# Patient Record
Sex: FEMALE | Race: WHITE | NOT HISPANIC OR LATINO | Employment: FULL TIME | URBAN - METROPOLITAN AREA
[De-identification: names, ages, dates, MRNs, and addresses within clinical notes are randomized per-mention and may not be internally consistent; named-entity substitution may affect disease eponyms.]

---

## 2019-10-22 ENCOUNTER — HOSPITAL ENCOUNTER (EMERGENCY)
Facility: HOSPITAL | Age: 24
Discharge: HOME | End: 2019-10-22
Attending: EMERGENCY MEDICINE | Admitting: EMERGENCY MEDICINE
Payer: COMMERCIAL

## 2019-10-22 VITALS
DIASTOLIC BLOOD PRESSURE: 70 MMHG | OXYGEN SATURATION: 98 % | RESPIRATION RATE: 18 BRPM | WEIGHT: 120 LBS | HEIGHT: 62 IN | SYSTOLIC BLOOD PRESSURE: 119 MMHG | TEMPERATURE: 97.8 F | HEART RATE: 80 BPM | BODY MASS INDEX: 22.08 KG/M2

## 2019-10-22 DIAGNOSIS — F41.9 ANXIETY: Primary | ICD-10-CM

## 2019-10-22 LAB
ANION GAP SERPL CALCULATED.3IONS-SCNC: 6 MMOL/L (ref 4–13)
BASOPHILS # BLD AUTO: 0.03 THOUSANDS/ΜL (ref 0–0.1)
BASOPHILS NFR BLD AUTO: 1 % (ref 0–1)
BUN SERPL-MCNC: 8 MG/DL (ref 5–25)
CALCIUM SERPL-MCNC: 9 MG/DL (ref 8.3–10.1)
CHLORIDE SERPL-SCNC: 110 MMOL/L (ref 100–108)
CO2 SERPL-SCNC: 24 MMOL/L (ref 21–32)
CREAT SERPL-MCNC: 0.69 MG/DL (ref 0.6–1.3)
EOSINOPHIL # BLD AUTO: 0.1 THOUSAND/ΜL (ref 0–0.61)
EOSINOPHIL NFR BLD AUTO: 2 % (ref 0–6)
ERYTHROCYTE [DISTWIDTH] IN BLOOD BY AUTOMATED COUNT: 11.8 % (ref 11.6–15.1)
EXT PREG TEST URINE: NEGATIVE
EXT. CONTROL ED NAV: NORMAL
GFR SERPL CREATININE-BSD FRML MDRD: 121 ML/MIN/1.73SQ M
GLUCOSE SERPL-MCNC: 117 MG/DL (ref 65–140)
HCT VFR BLD AUTO: 38.8 % (ref 34.8–46.1)
HGB BLD-MCNC: 13.5 G/DL (ref 11.5–15.4)
IMM GRANULOCYTES # BLD AUTO: 0 THOUSAND/UL (ref 0–0.2)
IMM GRANULOCYTES NFR BLD AUTO: 0 % (ref 0–2)
LYMPHOCYTES # BLD AUTO: 1.44 THOUSANDS/ΜL (ref 0.6–4.47)
LYMPHOCYTES NFR BLD AUTO: 34 % (ref 14–44)
MCH RBC QN AUTO: 30.5 PG (ref 26.8–34.3)
MCHC RBC AUTO-ENTMCNC: 34.8 G/DL (ref 31.4–37.4)
MCV RBC AUTO: 88 FL (ref 82–98)
MONOCYTES # BLD AUTO: 0.24 THOUSAND/ΜL (ref 0.17–1.22)
MONOCYTES NFR BLD AUTO: 6 % (ref 4–12)
NEUTROPHILS # BLD AUTO: 2.38 THOUSANDS/ΜL (ref 1.85–7.62)
NEUTS SEG NFR BLD AUTO: 57 % (ref 43–75)
NRBC BLD AUTO-RTO: 0 /100 WBCS
PLATELET # BLD AUTO: 127 THOUSANDS/UL (ref 149–390)
PMV BLD AUTO: 11.2 FL (ref 8.9–12.7)
POTASSIUM SERPL-SCNC: 3.9 MMOL/L (ref 3.5–5.3)
RBC # BLD AUTO: 4.43 MILLION/UL (ref 3.81–5.12)
SODIUM SERPL-SCNC: 140 MMOL/L (ref 136–145)
TSH SERPL DL<=0.05 MIU/L-ACNC: 3.62 UIU/ML (ref 0.36–3.74)
WBC # BLD AUTO: 4.19 THOUSAND/UL (ref 4.31–10.16)

## 2019-10-22 PROCEDURE — 85025 COMPLETE CBC W/AUTO DIFF WBC: CPT | Performed by: EMERGENCY MEDICINE

## 2019-10-22 PROCEDURE — 84443 ASSAY THYROID STIM HORMONE: CPT | Performed by: EMERGENCY MEDICINE

## 2019-10-22 PROCEDURE — 36415 COLL VENOUS BLD VENIPUNCTURE: CPT | Performed by: EMERGENCY MEDICINE

## 2019-10-22 PROCEDURE — 80048 BASIC METABOLIC PNL TOTAL CA: CPT | Performed by: EMERGENCY MEDICINE

## 2019-10-22 PROCEDURE — 81025 URINE PREGNANCY TEST: CPT | Performed by: EMERGENCY MEDICINE

## 2019-10-22 PROCEDURE — 99283 EMERGENCY DEPT VISIT LOW MDM: CPT

## 2019-10-22 PROCEDURE — 93005 ELECTROCARDIOGRAM TRACING: CPT

## 2019-10-22 PROCEDURE — 99284 EMERGENCY DEPT VISIT MOD MDM: CPT | Performed by: EMERGENCY MEDICINE

## 2019-10-22 RX ORDER — LORAZEPAM 0.5 MG/1
0.5 TABLET ORAL ONCE
Status: COMPLETED | OUTPATIENT
Start: 2019-10-22 | End: 2019-10-22

## 2019-10-22 RX ORDER — MULTIVITAMIN
1 TABLET ORAL DAILY
COMMUNITY

## 2019-10-22 RX ORDER — NORETHINDRONE ACETATE AND ETHINYL ESTRADIOL 1; 5 MG/1; UG/1
TABLET ORAL DAILY
COMMUNITY

## 2019-10-22 RX ADMIN — LORAZEPAM 0.5 MG: 0.5 TABLET ORAL at 08:36

## 2019-10-22 NOTE — ED ATTENDING ATTESTATION
10/22/2019  I, Benjamin Solis MD, saw and evaluated the patient  I have discussed the patient with the resident/non-physician practitioner and agree with the resident's/non-physician practitioner's findings, Plan of Care, and MDM as documented in the resident's/non-physician practitioner's note, except where noted  All available labs and Radiology studies were reviewed  I was present for key portions of any procedure(s) performed by the resident/non-physician practitioner and I was immediately available to provide assistance  At this point I agree with the current assessment done in the Emergency Department  I have conducted an independent evaluation of this patient a history and physical is as follows:    ED Course     Patient reports approximately 10 days of feeling increased anxiety  The patient has had a history of anxiety in the past was medicated for her anxiety in the past   The patient is unaware of any new psychosocial stressors and states that she has not been depressed but has been quite happy lately  Patient has periods of feeling anxious where she feels her heart beating fast and feels the heart pounding in her chest   Patient does not have true chest pain but feels like she is breathing fast   The patient works night shift and had 1 of these episodes over the night shift and so decided to come to the emergency department  The patient feels back to normal now  The patient also reports mood swings recently  Physical exam demonstrates a pleasant alert nontoxic female who appears to be and no distress  HEENT exam is normal   Lungs are clear with equal breath sounds  The heart had a regular rate rhythm  The abdomen is soft and nontender  Chest was nontender  Patient was alert and oriented x3 without focal neurologic deficit  Skin had no rash  The patient denies suicidal homicidal ideation  There is no evidence of delusions or hallucinations      Critical Care Time  Procedures

## 2019-10-22 NOTE — ED PROVIDER NOTES
History  Chief Complaint   Patient presents with    Anxiety     Pt states recently she feels like her anxiety has been at an all time high, almost like her heart is beating out of her chest       Patient is a 48-year-old female a past medical history of anxiety presenting for anxiety  Patient says that over the last 2 months or anxiety has gotten worse  She says over the last week it has been really bad  Patient says that she was at work tonight when she developed the feeling like her heart was pounding harder "  She says it was associated with some chest tightness and shortness of breath  Patient says that this feels similar to anxiety that she has had in the past   She also says that she has had mood swings over the past month or so and has been getting her  Every other week    Patient says that she stopped her anxiety medication about 4 years ago with no issues up until now  She says that she is feeling better now  She denies headache, dizziness, nausea, vomiting, abdominal pain  She denies any medical history and does not take any medications besides her birth control  Prior to Admission Medications   Prescriptions Last Dose Informant Patient Reported? Taking? Multiple Vitamin (MULTIVITAMIN) tablet  Self Yes Yes   Sig: Take 1 tablet by mouth daily   norethindrone-ethinyl estradiol (FEMHRT 1/5) 1-5 MG-MCG TABS  Self Yes Yes   Sig: Take by mouth daily      Facility-Administered Medications: None       No past medical history on file  No past surgical history on file  No family history on file  I have reviewed and agree with the history as documented  Social History     Tobacco Use    Smoking status: Current Every Day Smoker     Types: E-Cigarettes    Smokeless tobacco: Never Used   Substance Use Topics    Alcohol use: Not Currently    Drug use: Not Currently        Review of Systems   Constitutional: Negative for chills, diaphoresis and fever     HENT: Negative for congestion, sinus pressure, sore throat and trouble swallowing  Eyes: Negative for pain, discharge and itching  Respiratory: Positive for chest tightness and shortness of breath  Negative for cough and wheezing  Cardiovascular: Negative for chest pain, palpitations and leg swelling  Gastrointestinal: Negative for abdominal distention, abdominal pain, blood in stool, diarrhea, nausea and vomiting  Endocrine: Negative for polyphagia and polyuria  Genitourinary: Negative for difficulty urinating, dysuria, flank pain, hematuria, pelvic pain and vaginal bleeding  Musculoskeletal: Negative for arthralgias and back pain  Skin: Negative for color change and rash  Neurological: Negative for dizziness, syncope, weakness, light-headedness and headaches  Psychiatric/Behavioral: The patient is nervous/anxious  Physical Exam  ED Triage Vitals   Temperature Pulse Respirations Blood Pressure SpO2   10/22/19 0635 10/22/19 0637 10/22/19 0637 10/22/19 0637 10/22/19 0637   97 8 °F (36 6 °C) 86 18 158/89 99 %      Temp Source Heart Rate Source Patient Position - Orthostatic VS BP Location FiO2 (%)   10/22/19 0635 10/22/19 0637 10/22/19 0637 10/22/19 0637 --   Oral Monitor Lying Right arm       Pain Score       10/22/19 0637       4             Orthostatic Vital Signs  Vitals:    10/22/19 0637 10/22/19 0837   BP: 158/89 119/70   Pulse: 86 80   Patient Position - Orthostatic VS: Lying Lying       Physical Exam   Constitutional: She is oriented to person, place, and time  She appears well-developed and well-nourished  No distress  HENT:   Head: Normocephalic and atraumatic  Right Ear: External ear normal    Left Ear: External ear normal    Mouth/Throat: No oropharyngeal exudate  Eyes: Pupils are equal, round, and reactive to light  Conjunctivae are normal    Neck: Normal range of motion  Neck supple  Cardiovascular: Normal rate, regular rhythm, normal heart sounds and intact distal pulses   Exam reveals no gallop and no friction rub  No murmur heard  Pulmonary/Chest: Effort normal and breath sounds normal  No respiratory distress  She has no wheezes  She has no rales  Abdominal: Soft  She exhibits no distension  There is no tenderness  There is no guarding  Musculoskeletal: Normal range of motion  She exhibits no edema, tenderness or deformity  Lymphadenopathy:     She has no cervical adenopathy  Neurological: She is alert and oriented to person, place, and time  No cranial nerve deficit or sensory deficit  She exhibits normal muscle tone  Skin: Skin is warm and dry  Psychiatric: She has a normal mood and affect  Nursing note and vitals reviewed  ED Medications  Medications   LORazepam (ATIVAN) tablet 0 5 mg (0 5 mg Oral Given 10/22/19 0836)       Diagnostic Studies  Results Reviewed     Procedure Component Value Units Date/Time    POCT pregnancy, urine [[id number]]  (Normal) Resulted:  10/22/19 0752    Lab Status:  Final result Updated:  10/22/19 0752     EXT PREG TEST UR (Ref: Negative) Negative     Control valid    TSH, 3rd generation with Free T4 reflex [[id number]]  (Normal) Collected:  10/22/19 0710    Lab Status:  Final result Specimen:  Blood from Arm, Left Updated:  10/22/19 0742     TSH 3RD GENERATON 3 620 uIU/mL     Narrative:       Patients undergoing fluorescein dye angiography may retain small amounts of fluorescein in the body for 48-72 hours post procedure  Samples containing fluorescein can produce falsely depressed TSH values  If the patient had this procedure,a specimen should be resubmitted post fluorescein clearance        Basic metabolic panel [[id number]]  (Abnormal) Collected:  10/22/19 0710    Lab Status:  Final result Specimen:  Blood from Arm, Left Updated:  10/22/19 0742     Sodium 140 mmol/L      Potassium 3 9 mmol/L      Chloride 110 mmol/L      CO2 24 mmol/L      ANION GAP 6 mmol/L      BUN 8 mg/dL      Creatinine 0 69 mg/dL      Glucose 117 mg/dL      Calcium 9 0 mg/dL eGFR 121 ml/min/1 73sq m     Narrative:       [place] guidelines for Chronic Kidney Disease (CKD):     Stage 1 with normal or high GFR (GFR > 90 mL/min/1 73 square meters)    Stage 2 Mild CKD (GFR = 60-89 mL/min/1 73 square meters)    Stage 3A Moderate CKD (GFR = 45-59 mL/min/1 73 square meters)    Stage 3B Moderate CKD (GFR = 30-44 mL/min/1 73 square meters)    Stage 4 Severe CKD (GFR = 15-29 mL/min/1 73 square meters)    Stage 5 End Stage CKD (GFR <15 mL/min/1 73 square meters)  Note: GFR calculation is accurate only with a steady state creatinine    CBC and differential [[id number]]  (Abnormal) Collected:  10/22/19 0710    Lab Status:  Final result Specimen:  Blood from Arm, Left Updated:  10/22/19 0717     WBC 4 19 Thousand/uL      RBC 4 43 Million/uL      Hemoglobin 13 5 g/dL      Hematocrit 38 8 %      MCV 88 fL      MCH 30 5 pg      MCHC 34 8 g/dL      RDW 11 8 %      MPV 11 2 fL      Platelets 028 Thousands/uL      nRBC 0 /100 WBCs      Neutrophils Relative 57 %      Immat GRANS % 0 %      Lymphocytes Relative 34 %      Monocytes Relative 6 %      Eosinophils Relative 2 %      Basophils Relative 1 %      Neutrophils Absolute 2 38 Thousands/µL      Immature Grans Absolute 0 00 Thousand/uL      Lymphocytes Absolute 1 44 Thousands/µL      Monocytes Absolute 0 24 Thousand/µL      Eosinophils Absolute 0 10 Thousand/µL      Basophils Absolute 0 03 Thousands/µL                  No orders to display         Procedures  ECG 12 Lead Documentation Only  Date/Time: 10/22/2019 2:46 PM  Performed by: Letha Fulton DO  Authorized by: Letha Fulton DO     Indications / Diagnosis:  Chest tightness  ECG reviewed by me, the ED Provider: yes    Patient location:  ED  Previous ECG:     Previous ECG:  Unavailable    Comparison to cardiac monitor: Yes    Interpretation:     Interpretation: normal    Rate:     ECG rate:  76    ECG rate assessment: normal    Rhythm:     Rhythm: sinus rhythm Ectopy:     Ectopy: none    QRS:     QRS axis:  Normal  Conduction:     Conduction: normal    ST segments:     ST segments:  Normal  T waves:     T waves: normal              ED Course                               MDM  Number of Diagnoses or Management Options  Diagnosis management comments: 16-year-old female presenting for anxiety  Has had worsening anxiety over the past month acutely worse over the past week  This morning at work had episode of feeling her heart was beating stronger than normal   Was associated with some chest tightness and shortness of breath  Feels similar anxiety she has had before  No other medical history  Will obtain CBC to check hemoglobin, will check BMP for electrolytes  Will obtain TSH  Will get EKG  Patient says she is feeling better now so does not need anxiety medication at this time  Disposition  Final diagnoses:   Anxiety     Time reflects when diagnosis was documented in both MDM as applicable and the Disposition within this note     Time User Action Codes Description Comment    10/22/2019  8:07 AM Kaleb Britton Add [F41 9] Anxiety       ED Disposition     ED Disposition Condition Date/Time Comment    Discharge Stable Tue Oct 22, 2019  8:07 AM Rojelio Isaacs discharge to home/self care  Follow-up Information     Follow up With Specialties Details Why Contact Info    Kacie Fountain MD [facility] Schedule an appointment as soon as possible for a visit  For follow up of anxiety [address] [place] [id number] [phone]            Discharge Medication List as of 10/22/2019  8:07 AM      CONTINUE these medications which have NOT CHANGED    Details   Multiple Vitamin (MULTIVITAMIN) tablet Take 1 tablet by mouth daily, Historical Med      norethindrone-ethinyl estradiol (FEMHRT 1/5) 1-5 MG-MCG TABS Take by mouth daily, Historical Med           No discharge procedures on file      ED Provider  Attending physically available and sang Marcus I managed the patient along with the ED Attending      Electronically Signed by         Lionel Phan DO  10/22/19 7307

## 2019-10-23 LAB
ATRIAL RATE: 76 BPM
P AXIS: 60 DEGREES
PR INTERVAL: 124 MS
QRS AXIS: 88 DEGREES
QRSD INTERVAL: 86 MS
QT INTERVAL: 394 MS
QTC INTERVAL: 443 MS
T WAVE AXIS: 60 DEGREES
VENTRICULAR RATE: 76 BPM

## 2019-10-23 PROCEDURE — 93010 ELECTROCARDIOGRAM REPORT: CPT | Performed by: INTERNAL MEDICINE

## 2022-12-16 ENCOUNTER — OCCMED (OUTPATIENT)
Dept: URGENT CARE | Facility: CLINIC | Age: 27
End: 2022-12-16

## 2022-12-16 ENCOUNTER — APPOINTMENT (OUTPATIENT)
Dept: RADIOLOGY | Facility: CLINIC | Age: 27
End: 2022-12-16

## 2022-12-16 DIAGNOSIS — M54.50 ACUTE BILATERAL LOW BACK PAIN WITHOUT SCIATICA: ICD-10-CM
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
## 2022-12-16 DIAGNOSIS — M54.50 ACUTE BILATERAL LOW BACK PAIN WITHOUT SCIATICA: Primary | ICD-10-CM

## 2022-12-19 ENCOUNTER — APPOINTMENT (OUTPATIENT)
Dept: URGENT CARE | Facility: CLINIC | Age: 27
End: 2022-12-19

## 2023-01-04 ENCOUNTER — EVALUATION (OUTPATIENT)
Dept: PHYSICAL THERAPY | Age: 28
End: 2023-01-04

## 2023-01-04 DIAGNOSIS — M54.16 LUMBAR RADICULOPATHY: Primary | ICD-10-CM

## 2023-01-04 NOTE — LETTER
2023    Bulah Kanner, [address] [facility] [address]    Patient: Cecil Boyer   YOB: 1995   Date of Visit: 2023     Encounter Diagnosis     ICD-10-CM    1  Lumbar radiculopathy  M54 16           Dear Dr Rome Lopez:    Thank you for your recent referral of Cecil Boyer  Please review the attached evaluation summary from Jaye's recent visit  Please verify that you agree with the plan of care by signing the attached order  If you have any questions or concerns, please do not hesitate to call  I sincerely appreciate the opportunity to share in the care of one of your patients and hope to have another opportunity to work with you in the near future  Sincerely,    Carmelita Carlos, PT      Referring Provider:      I certify that I have read the below Plan of Care and certify the need for these services furnished under this plan of treatment while under my care  Bulah Kanner, PA-C  [address] [place] [id number]  [place] [facility] [place]          PT Evaluation     Today's date: 2023  Patient name: Cecil Boyer  : 1995  MRN: [id number]  Referring provider: Mendel Barlow, PA*  Dx:   Encounter Diagnosis     ICD-10-CM    1  Lumbar radiculopathy  M54 16                      Assessment  Assessment details: Pt reports to PT with cc of lumbar and R buttock pain that began roughly one month ago while lifting at work  Pt has difficulty with lifting at work and prolonged sitting   Pt has symptoms consistent with lumbar radiculopathy and would benefit from extension program   Impairments: abnormal muscle firing, abnormal muscle tone, abnormal or restricted ROM, abnormal movement, activity intolerance, impaired physical strength, lacks appropriate home exercise program, pain with function and poor body mechanics    Goals  In 4 weeks pt will:  -Be independent with phase I of HEP  -Increase LE strength by 1/2 grade  -Increase Lumber ROM by 25%    By discharge pt will:  -Be independent with Phase II of HEP  -Demonstrate full LE strength  -Demonstrate full Lumbar ROM  -Report minimal pain with ADLs    Plan  Patient would benefit from: skilled physical therapy  Planned therapy interventions: abdominal trunk stabilization, joint mobilization, manual therapy, muscle pump exercises, neuromuscular re-education, patient education, strengthening, stretching, therapeutic activities, therapeutic exercise, functional ROM exercises and home exercise program  Frequency: 2x week  Duration in weeks: 4  Plan of Care beginning date: 1/4/2023  Plan of Care expiration date: 2/1/2023  Treatment plan discussed with: patient and PTA        Subjective    Objective     Active Range of Motion     Additional Active Range of Motion Details  Lumbar ROM as % of normal ROM    Flex:100  Ext:50  R ROT:75  L ROT:75      Strength/Myotome Testing     Left Hip   Planes of Motion   Abduction: 3+    Right Hip   Planes of Motion   Abduction: 3+    Left Knee   Flexion: 4  Extension: 4    Right Knee   Flexion: 4  Extension: 4    Left Ankle/Foot   Dorsiflexion: 4  Plantar flexion: 4    Right Ankle/Foot   Dorsiflexion: 4  Plantar flexion: 4    Tests     Lumbar     Left   Negative passive SLR and slump test      Right   Positive passive SLR and slump test              Precautions: N/A      Manuals                                                                 Neuro Re-Ed                                                                                                        Ther Ex             Prone lying with MH 10'            HEP-prone press up, prone lying, lifting mechanics 15'                                                                                          Ther Activity                                       Gait Training                                       Modalities

## 2023-01-04 NOTE — PROGRESS NOTES
PT Evaluation     Today's date: 2023  Patient name: Mitul Grijalva  : 1995  MRN: [id number]  Referring provider: NASIR Mayfield*  Dx:   Encounter Diagnosis     ICD-10-CM    1  Lumbar radiculopathy  M54 16                      Assessment  Assessment details: Pt reports to PT with cc of lumbar and R buttock pain that began roughly one month ago while lifting at work  Pt has difficulty with lifting at work and prolonged sitting   Pt has symptoms consistent with lumbar radiculopathy and would benefit from extension program   Impairments: abnormal muscle firing, abnormal muscle tone, abnormal or restricted ROM, abnormal movement, activity intolerance, impaired physical strength, lacks appropriate home exercise program, pain with function and poor body mechanics    Goals  In 4 weeks pt will:  -Be independent with phase I of HEP  -Increase LE strength by 1/2 grade  -Increase Lumber ROM by 25%    By discharge pt will:  -Be independent with Phase II of HEP  -Demonstrate full LE strength  -Demonstrate full Lumbar ROM  -Report minimal pain with ADLs    Plan  Patient would benefit from: skilled physical therapy  Planned therapy interventions: abdominal trunk stabilization, joint mobilization, manual therapy, muscle pump exercises, neuromuscular re-education, patient education, strengthening, stretching, therapeutic activities, therapeutic exercise, functional ROM exercises and home exercise program  Frequency: 2x week  Duration in weeks: 4  Plan of Care beginning date: 2023  Plan of Care expiration date: 2023  Treatment plan discussed with: patient and PTA        Subjective    Objective     Active Range of Motion     Additional Active Range of Motion Details  Lumbar ROM as % of normal ROM    Flex:100  Ext:50  R ROT:75  L ROT:75      Strength/Myotome Testing     Left Hip   Planes of Motion   Abduction: 3+    Right Hip   Planes of Motion   Abduction: 3+    Left Knee   Flexion: 4  Extension: 4    Right Knee   Flexion: 4  Extension: 4    Left Ankle/Foot   Dorsiflexion: 4  Plantar flexion: 4    Right Ankle/Foot   Dorsiflexion: 4  Plantar flexion: 4    Tests     Lumbar     Left   Negative passive SLR and slump test      Right   Positive passive SLR and slump test               Precautions: N/A      Manuals                                                                 Neuro Re-Ed                                                                                                        Ther Ex             Prone lying with MH 10'            HEP-prone press up, prone lying, lifting mechanics 15'                                                                                          Ther Activity                                       Gait Training                                       Modalities             MH

## 2023-01-09 ENCOUNTER — OFFICE VISIT (OUTPATIENT)
Dept: PHYSICAL THERAPY | Age: 28
End: 2023-01-09

## 2023-01-09 DIAGNOSIS — M54.16 LUMBAR RADICULOPATHY: Primary | ICD-10-CM

## 2023-01-09 NOTE — PROGRESS NOTES
Daily Note     Today's date: 2023  Patient name: Adam Sarkar  : 1995  MRN: [id number]  Referring provider: NASIR Esteves*  Dx:   Encounter Diagnosis     ICD-10-CM    1  Lumbar radiculopathy  M54 16                      Subjective: Pt reports intermittent improvement, pain is worse with working       Objective: See treatment diary below      Assessment: Tolerated treatment well  Patient demonstrated fatigue post treatment, would benefit from continued PT and pt displays increased lumbar extension, continues to have lumbar pain with this motion       Plan: Continue per plan of care  Progress treatment as tolerated         Precautions: N/A      Manuals             B lumbar roll  JASEN AGGARWAL                                     Neuro Re-Ed                                                                                                        Ther Ex             Prone lying with  10'            HEP-prone press up, prone lying, lifting mechanics 15'            Prone lying-wedge  10'           PPU  2x10           Standing ext  HEP                                                  Ther Activity                                       Gait Training                                       Modalities               10'

## 2023-01-12 ENCOUNTER — OFFICE VISIT (OUTPATIENT)
Dept: PHYSICAL THERAPY | Age: 28
End: 2023-01-12

## 2023-01-12 DIAGNOSIS — M54.16 LUMBAR RADICULOPATHY: Primary | ICD-10-CM

## 2023-01-12 NOTE — PROGRESS NOTES
Daily Note     Today's date: 2023  Patient name: Kris Kong  : 1995  MRN: [id number]  Referring provider: NASIR Mojica*  Dx:   Encounter Diagnosis     ICD-10-CM    1  Lumbar radiculopathy  M54 16                      Subjective: Pt reports intermittent R buttock sciatic symptoms especially with lifting at work  Pt does report increase in diffuse bilateral LBP  Objective: See treatment diary below    Assessment: Pt continues to demonstrate RLE radicular symptom centralization with repeated lumbar extension and would benefit from continued PT to consistently reduce radicular symptoms and perform work related activities without exacerbation of symptoms  Tolerated treatment well  Plan: Continue per plan of care  Progress treatment as tolerated               Precautions: N/A    Manuals            B lumbar roll  JASEN AGGARWAL                                    Neuro Re-Ed                                                                                                        Ther Ex             Prone lying with  10'            HEP-prone press up, prone lying, lifting mechanics 15'  Lifting techniques- reviewed           Prone lying-wedge  10' 10'          PPU  2x10 3x20          LTR   10x ea          Standing ext  HEP Reviewed                                                 Ther Activity                                       Gait Training                                       Modalities               10' Defers

## 2023-01-16 ENCOUNTER — OFFICE VISIT (OUTPATIENT)
Dept: PHYSICAL THERAPY | Age: 28
End: 2023-01-16

## 2023-01-16 DIAGNOSIS — M54.16 LUMBAR RADICULOPATHY: Primary | ICD-10-CM

## 2023-01-16 NOTE — PROGRESS NOTES
Daily Note     Today's date: 2023  Patient name: Bridget Bran  : 1995  MRN: [id number]  Referring provider: NASIR Vaughan*  Dx:   Encounter Diagnosis     ICD-10-CM    1  Lumbar radiculopathy  M54 16                      Subjective: Pt reports improvement in LE symptoms    Objective: See treatment diary below      Assessment: Tolerated treatment well  Patient demonstrated fatigue post treatment, would benefit from continued PT and pt continues to have lumbar pain, with centralization of symptoms with extension       Plan: Continue per plan of care  Progress treatment as tolerated         Precautions: N/A    Manuals           B lumbar roll  JASEN BEST mobs  JASEN AGGARWAL                                   Neuro Re-Ed                                                                                                        Ther Ex             Prone lying with  10'            HEP-prone press up, prone lying, lifting mechanics 15'  Lifting techniques- reviewed           Prone lying-wedge  10' 10' 10'         PPU  2x10 3x20 3x20         LTR   10x ea 10x ea         Standing ext  HEP Reviewed          Sideglide-R closing    2x10                                   Ther Activity                                       Gait Training                                       Modalities               10' Defers

## 2023-01-18 ENCOUNTER — OFFICE VISIT (OUTPATIENT)
Dept: PHYSICAL THERAPY | Age: 28
End: 2023-01-18

## 2023-01-18 DIAGNOSIS — M54.16 LUMBAR RADICULOPATHY: Primary | ICD-10-CM

## 2023-01-18 NOTE — PROGRESS NOTES
Daily Note     Today's date: 2023  Patient name: Bridget Bran  : 1995  MRN: [id number]  Referring provider: NASIR Vaughan*  Dx:   Encounter Diagnosis     ICD-10-CM    1  Lumbar radiculopathy  M54 16                      Subjective: Pt reports N/T into R LE at work       Objective: See treatment diary below      Assessment: Tolerated treatment well  Patient demonstrated fatigue post treatment, would benefit from continued PT and pt has abolishment of LE symptoms with PPU with overpressure/PPU with strap      Plan: Continue per plan of care  Progress treatment as tolerated         Precautions: N/A    Manuals          B lumbar roll  AGGARWAL AGGARWAL          PA mobs  JASEN AGGARWAL AGGARWAL         PPU w/ ovepressure     AGGARWAL                     Neuro Re-Ed                                                                                                        Ther Ex             Prone lying with  10'            HEP-prone press up, prone lying, lifting mechanics 15'  Lifting techniques- reviewed           Prone lying-wedge  10' 10' 10'         PPU  2x10 3x20 3x20 5x10        LTR   10x ea 10x ea         Standing ext  HEP Reviewed          Sideglide-R closing    2x10         PPU w/ strap     4x10                     Ther Activity                                       Gait Training                                       Modalities               10' Defers

## 2023-01-24 ENCOUNTER — OFFICE VISIT (OUTPATIENT)
Dept: PHYSICAL THERAPY | Age: 28
End: 2023-01-24

## 2023-01-24 DIAGNOSIS — M54.16 LUMBAR RADICULOPATHY: Primary | ICD-10-CM

## 2023-01-24 NOTE — PROGRESS NOTES
Daily Note     Today's date: 2023  Patient name: Sheri Parkinson  : 1995  MRN: [id number]  Referring provider: NASIR Sanders*  Dx:   Encounter Diagnosis     ICD-10-CM    1  Lumbar radiculopathy  M54 16                      Subjective: Pt reports no leg pain since last visit      Objective: See treatment diary below      Assessment: Tolerated treatment well  Patient demonstrated fatigue post treatment, would benefit from continued PT and pt demonstrated improved extension following session      Plan: Continue per plan of care  Progress treatment as tolerated         Precautions: N/A    Manuals         B lumbar roll  JASEN AGGARWAL          PA mobs  JASEN AGGARWAL         PPU w/ ovepressure     AGGARWAL                     Neuro Re-Ed                                                                                                        Ther Ex             Prone lying with  10'            HEP-prone press up, prone lying, lifting mechanics 15'  Lifting techniques- reviewed           Prone lying-wedge  10' 10' 10'         PPU  2x10 3x20 3x20 5x10        LTR   10x ea 10x ea         Standing ext  HEP Reviewed          Sideglide-R closing    2x10         PPU w/ strap     4x10        EIL-H2L      5x10, 2x10 w/ strap        Ther Activity                                       Gait Training                                       Modalities               10' Defers

## 2023-01-26 ENCOUNTER — OFFICE VISIT (OUTPATIENT)
Dept: PHYSICAL THERAPY | Age: 28
End: 2023-01-26

## 2023-01-26 DIAGNOSIS — M54.16 LUMBAR RADICULOPATHY: Primary | ICD-10-CM

## 2023-01-26 NOTE — PROGRESS NOTES
Daily Note     Today's date: 2023  Patient name: Gerald Acuña  : 1995  MRN: [id number]  Referring provider: NASIR Lowery*  Dx:   Encounter Diagnosis     ICD-10-CM    1  Lumbar radiculopathy  M54 16                      Subjective: Pt has no complaints       Objective: See treatment diary below      Assessment: Tolerated treatment well  Patient demonstrated fatigue post treatment, would benefit from continued PT and pt returned to saggital plane extension without reproduction of symptoms       Plan: Continue per plan of care  Progress treatment as tolerated         Precautions: N/A    Manuals        B lumbar roll  JASEN AGGARWAL          PA mobs  JASEN AGGARWAL AGGARWAL      PPU w/ ovepressure     JASEN AGGARWAL                    Neuro Re-Ed                                                                                                        Ther Ex             Prone lying with  10'            HEP-prone press up, prone lying, lifting mechanics 15'  Lifting techniques- reviewed           Prone lying-wedge  10' 10' 10'         PPU  2x10 3x20 3x20 5x10  2x10      LTR   10x ea 10x ea         Standing ext  HEP Reviewed          Sideglide-R closing    2x10         PPU w/ strap     4x10  4x10      EIL-H2L      5x10, 2x10 w/ strap                                               Ther Activity                                       Gait Training                                       Modalities               10' Defers

## 2023-01-30 ENCOUNTER — OFFICE VISIT (OUTPATIENT)
Dept: PHYSICAL THERAPY | Age: 28
End: 2023-01-30

## 2023-01-30 DIAGNOSIS — M54.16 LUMBAR RADICULOPATHY: Primary | ICD-10-CM

## 2023-01-30 NOTE — PROGRESS NOTES
Daily Note     Today's date: 2023  Patient name: Ayanna Bañuelos  : 1995  MRN: [id number]  Referring provider: NASIR Robertson*  Dx:   Encounter Diagnosis     ICD-10-CM    1  Lumbar radiculopathy  M54 16                      Subjective: Patient has no complaints  Objective: See treatment diary below      Assessment: Tolerated treatment well  Patient demonstrated fatigue post treatment, exhibited good technique with therapeutic exercises, would benefit from continued PT and patient tolerated TA contraction for the first time without symptom reproduction  Plan: Continue per plan of care  Potential discharge next visit  Progress treament per protocol        Precautions: N/A    Manuals       B lumbar roll  JASEN AGGARWAL          PA mobs  JASEN AGGARWAL      PPU w/ ovepressure     JASEN AGGARWAL                    Neuro Re-Ed             Lifting technique        25# floor to waist; 20# waist to shoulders                                                                                   Ther Ex             Prone lying with  10'            HEP-prone press up, prone lying, lifting mechanics 15'  Lifting techniques- reviewed           Prone lying-wedge  10' 10' 10'         PPU  2x10 3x20 3x20 5x10  2x10 2x10     LTR   10x ea 10x ea         Standing ext  HEP Reviewed          Sideglide-R closing    2x10         PPU w/ strap     4x10  4x10      EIL-H2L      5x10, 2x10 w/ strap        HL TA contraction        2x10 with foot lifts     Pallof Press        2x10ea 7 5#                  Ther Activity                                       Gait Training                                       Modalities               10' Defers

## 2023-02-01 ENCOUNTER — OFFICE VISIT (OUTPATIENT)
Dept: PHYSICAL THERAPY | Age: 28
End: 2023-02-01

## 2023-02-01 DIAGNOSIS — M54.16 LUMBAR RADICULOPATHY: Primary | ICD-10-CM

## 2023-02-01 NOTE — PROGRESS NOTES
Daily Note     Today's date: 2023  Patient name: Britany Mao  : 1995  MRN: [id number]  Referring provider: NASIR Cano*  Dx:   Encounter Diagnosis     ICD-10-CM    1  Lumbar radiculopathy  M54 16                      Subjective: Pt has no complaints       Objective: See treatment diary below      Assessment: Tolerated treatment well  Patient due to pt's expiring workers' comp claim, pt was instructed on trialing flexion in 3-4 weeks and perfoming core strengthening for 4 weeks after reduction  Pt is agreeable to plan and may return in the future if symptoms return  Plan: Continue per plan of care  Progress treatment as tolerated         Precautions: N/A    Manuals      B lumbar roll  JASEN AGGARWAL          PA mobs  JASEN AGGARWAL AGGARWAL  AGGARWAL AGGARWAL      PPU w/ ovepressure     JASEN AGGARWAL                    Neuro Re-Ed             Lifting technique        25# floor to waist; 20# waist to shoulders                                                                                   Ther Ex             Prone lying with  10'            HEP-prone press up, prone lying, lifting mechanics 15'  Lifting techniques- reviewed           Prone lying-wedge  10' 10' 10'         PPU  2x10 3x20 3x20 5x10  2x10 2x10     LTR   10x ea 10x ea         Standing ext  HEP Reviewed          Sideglide-R closing    2x10         PPU w/ strap     4x10  4x10      EIL-H2L      5x10, 2x10 w/ strap        HL TA contraction        2x10 with foot lifts     Pallof Press        2x10ea 7 5#     HEP-see below         30'    Ther Activity                                       Gait Training                                       Modalities               10' Defers                           Exercises  • Standing Anti-Rotation Press with Anchored Resistance - 1 x daily - 7 x weekly - 2 sets - 10 reps  • Standing Trunk Rotation with Resistance - 1 x daily - 7 x weekly - 2 sets - 10 reps  • Supine Transversus Abdominis Bracing with Pelvic Floor Contraction - 1 x daily - 7 x weekly - 3 sets - 10 reps  • Bird Dog - 1 x daily - 7 x weekly - 1 sets - 3 reps - 30s hold  • Dead Bug - 1 x daily - 7 x weekly - 1 sets - 3 reps - 30s hold